# Patient Record
Sex: MALE | Race: WHITE | NOT HISPANIC OR LATINO | Employment: FULL TIME | URBAN - METROPOLITAN AREA
[De-identification: names, ages, dates, MRNs, and addresses within clinical notes are randomized per-mention and may not be internally consistent; named-entity substitution may affect disease eponyms.]

---

## 2022-05-13 ENCOUNTER — HOSPITAL ENCOUNTER (EMERGENCY)
Facility: HOSPITAL | Age: 32
Discharge: HOME | End: 2022-05-13
Attending: EMERGENCY MEDICINE | Admitting: EMERGENCY MEDICINE

## 2022-05-13 VITALS
DIASTOLIC BLOOD PRESSURE: 74 MMHG | TEMPERATURE: 97.8 F | OXYGEN SATURATION: 94 % | WEIGHT: 175 LBS | BODY MASS INDEX: 23.19 KG/M2 | HEIGHT: 73 IN | HEART RATE: 88 BPM | RESPIRATION RATE: 16 BRPM | SYSTOLIC BLOOD PRESSURE: 133 MMHG

## 2022-05-13 DIAGNOSIS — S61.214A LACERATION OF RIGHT RING FINGER WITHOUT FOREIGN BODY WITHOUT DAMAGE TO NAIL, INITIAL ENCOUNTER: Primary | ICD-10-CM

## 2022-05-13 DIAGNOSIS — T14.8XXA SKIN AVULSION: ICD-10-CM

## 2022-05-13 PROCEDURE — 99283 EMERGENCY DEPT VISIT LOW MDM: CPT

## 2022-05-13 PROCEDURE — 25010000002 TETANUS-DIPHTH-ACELL PERTUSSIS 5-2.5-18.5 LF-MCG/0.5 SUSPENSION PREFILLED SYRINGE: Performed by: EMERGENCY MEDICINE

## 2022-05-13 PROCEDURE — 90471 IMMUNIZATION ADMIN: CPT | Performed by: EMERGENCY MEDICINE

## 2022-05-13 PROCEDURE — 90715 TDAP VACCINE 7 YRS/> IM: CPT | Performed by: EMERGENCY MEDICINE

## 2022-05-13 RX ADMIN — TETANUS TOXOID, REDUCED DIPHTHERIA TOXOID AND ACELLULAR PERTUSSIS VACCINE, ADSORBED 0.5 ML: 5; 2.5; 8; 8; 2.5 SUSPENSION INTRAMUSCULAR at 22:47

## 2022-05-14 NOTE — ED PROVIDER NOTES
[facility]    EMERGENCY DEPARTMENT ENCOUNTER      Pt Name: Ajay Merchant  MRN: [id number]  YOB: 1990  Date of evaluation: 5/13/2022  Provider: Roque Villagomez MD    CHIEF COMPLAINT       Chief Complaint   Patient presents with   • Finger Laceration         HISTORY OF PRESENT ILLNESS  (Location/Symptom, Timing/Onset, Context/Setting, Quality, Duration, Modifying Factors, Severity.)   Ajay Merchant is a 31 y.o. male who presents to the emergency department with a laceration sustained to the volar distal ring finger of the right hand that occurred around 6:30 PM this evening.  Patient states that he was cooking dinner when he accidentally sliced his finger with a kitchen knife.  He avulsed a small piece of skin and has had ongoing mild bleeding not relieved with pressure since that time.  He denies any paresthesias or additional injury.  Unsure of last tetanus shot.      Nursing notes were reviewed.    REVIEW OF SYSTEMS    (2-9 systems for level 4, 10 or more for level 5)   ROS:  General:  No fevers, no chills, no weakness  Cardiovascular:  No chest pain, no palpitations  Respiratory:  No shortness of breath, no cough, no wheezing  Gastrointestinal:  No pain, no nausea, no vomiting, no diarrhea  Musculoskeletal:  No muscle pain, no joint pain  Skin: + Finger laceration  Neurologic:  No speech problems, no headache, no extremity numbness, no extremity tingling, no extremity weakness  Psychiatric:  No anxiety  Genitourinary:  No dysuria, no hematuria    Except as noted above the remainder of the review of systems was reviewed and negative.       PAST MEDICAL HISTORY   None    SURGICAL HISTORY     None    CURRENT MEDICATIONS     None    ALLERGIES     Patient has no known allergies.    FAMILY HISTORY     No family history on file.       SOCIAL HISTORY     Non- smoker      PHYSICAL EXAM    (up to 7 for level 4, 8 or more for level 5)     Vitals:    05/13/22 2146 05/13/22 2206   BP:  133/74   Pulse: 88   "  Resp: 16    Temp: 97.8 °F (36.6 °C)    TempSrc: Oral    SpO2: 94%    Weight: 79.4 kg (175 lb)    Height: 185.4 cm (73\")        Physical Exam  General: Awake, alert, no acute distress.  HEENT: Conjunctivae normal.  Neck: Trachea midline.  Cardiac: Heart regular rate  Lungs: Normal effort  Chest wall: No deformity  Abdomen: Nondistended  Musculoskeletal: No deformity.  Neuro: Alert and oriented x 4.  Dermatology: Small skin avulsion to the distal tip of the volar right ring finger.  No active bleeding noted.  Very superficial in nature without any foreign body or evidence of deep structure involvement.  Psych: Mentation is grossly normal, cognition is grossly normal. Affect is appropriate.          EMERGENCY DEPARTMENT COURSE and DIFFERENTIAL DIAGNOSIS/MDM:   Vitals:    Vitals:    05/13/22 2146 05/13/22 2206   BP:  133/74   Pulse: 88    Resp: 16    Temp: 97.8 °F (36.6 °C)    TempSrc: Oral    SpO2: 94%    Weight: 79.4 kg (175 lb)    Height: 185.4 cm (73\")             Small skin avulsion/laceration to the distal fourth finger of the right hand on the volar surface.  It superficial in nature without any evidence of tendon or neurovascular involvement.  Full functional status maintained.  Will update tetanus.  No indication for repair given complete avulsion of the overlying skin.    I had a discussion with the patient/family regarding diagnosis, diagnostic results, treatment plan, and medications.  The patient/family indicated understanding of these instructions.  I spent adequate time at the bedside preceding discharge necessary to personally discuss the aftercare instructions, giving patient education, providing explanations of the results of our evaluations/findings, and my decision making to assure that the patient/family understand the plan of care.  Time was allotted to answer questions at that time and throughout the ED course.  Emphasis was placed on timely follow-up after discharge.  I also discussed the " potential for the development of an acute emergent condition requiring further evaluation, admission, or even surgical intervention. I discussed that we found nothing during the visit today indicating the need for further workup, admission, or the presence of an unstable medical condition.  I encouraged the patient to return to the emergency department immediately for ANY concerns, worsening, new complaints, or if symptoms persist and unable to seek follow-up in a timely fashion.  The patient/family expressed understanding and agreement with this plan.  The patient will follow-up with their PCP in 1-2 days for reevaluation.       PROCEDURES:    WOUND CARE  Wound care performed by myself with verbal consent from the patient.  The wound was thoroughly cleaned with chlorhexidine and irrigated with normal saline copiously.  Wound is superficial without any evidence of active bleeding, foreign body, or deep structure involvement.  Pressure dressing using 4 x 4 and Coban was placed.  There were no complications.        FINAL IMPRESSION      1. Laceration of right ring finger without foreign body without damage to nail, initial encounter    2. Skin avulsion          DISPOSITION/PLAN     ED Disposition     ED Disposition   Discharge    Condition   Stable    Comment   --             PATIENT REFERRED TO:  Provider, No Known  [place] [address]    Schedule an appointment as soon as possible for a visit in 2 days      [facility] Emergency Department  [address] [address] [phone]    If symptoms worsen      DISCHARGE MEDICATIONS:     Medication List      You have not been prescribed any medications.             Comment: Please note this report has been produced using speech recognition software.      Roque Villagomez MD  Attending Emergency Physician               Roque Villagomez MD  05/13/22 6765